# Patient Record
Sex: MALE | Race: WHITE | Employment: OTHER | URBAN - METROPOLITAN AREA
[De-identification: names, ages, dates, MRNs, and addresses within clinical notes are randomized per-mention and may not be internally consistent; named-entity substitution may affect disease eponyms.]

---

## 2019-03-18 RX ORDER — BISACODYL 5 MG/1
5 TABLET, DELAYED RELEASE ORAL DAILY PRN
COMMUNITY

## 2019-03-18 RX ORDER — MULTIVITAMIN,THERAPEUTIC
1 TABLET ORAL DAILY
COMMUNITY

## 2019-03-18 RX ORDER — ASCORBIC ACID 500 MG
500 TABLET ORAL DAILY
Status: ON HOLD | COMMUNITY
End: 2019-03-19

## 2019-03-18 RX ORDER — DOCUSATE SODIUM 100 MG/1
100 CAPSULE, LIQUID FILLED ORAL DAILY PRN
COMMUNITY

## 2019-03-18 RX ORDER — LIDOCAINE/PRILOCAINE 2.5 %-2.5%
CREAM (GRAM) TOPICAL DAILY PRN
COMMUNITY

## 2019-03-18 SDOH — HEALTH STABILITY: MENTAL HEALTH: HOW OFTEN DO YOU HAVE A DRINK CONTAINING ALCOHOL?: NEVER

## 2019-03-19 ENCOUNTER — HOSPITAL ENCOUNTER (OUTPATIENT)
Facility: CLINIC | Age: 64
Setting detail: OBSERVATION
Discharge: HOME | End: 2019-03-20
Attending: UROLOGY | Admitting: UROLOGY
Payer: MEDICARE

## 2019-03-19 ENCOUNTER — ANESTHESIA (OUTPATIENT)
Dept: SURGERY | Facility: CLINIC | Age: 64
End: 2019-03-19
Payer: MEDICARE

## 2019-03-19 ENCOUNTER — ANESTHESIA EVENT (OUTPATIENT)
Dept: SURGERY | Facility: CLINIC | Age: 64
End: 2019-03-19
Payer: MEDICARE

## 2019-03-19 DIAGNOSIS — C61 PROSTATE CANCER (H): Primary | ICD-10-CM

## 2019-03-19 LAB
ABO + RH BLD: NORMAL
ABO + RH BLD: NORMAL
BLD GP AB SCN SERPL QL: NORMAL
BLOOD BANK CMNT PATIENT-IMP: NORMAL
BLOOD BANK CMNT PATIENT-IMP: NORMAL
CREAT SERPL-MCNC: 5.42 MG/DL (ref 0.66–1.25)
GFR SERPL CREATININE-BSD FRML MDRD: 10 ML/MIN/{1.73_M2}
POTASSIUM SERPL-SCNC: 3.6 MMOL/L (ref 3.4–5.3)
SPECIMEN EXP DATE BLD: NORMAL

## 2019-03-19 PROCEDURE — 40000170 ZZH STATISTIC PRE-PROCEDURE ASSESSMENT II: Performed by: UROLOGY

## 2019-03-19 PROCEDURE — 27210794 ZZH OR GENERAL SUPPLY STERILE: Performed by: UROLOGY

## 2019-03-19 PROCEDURE — 82565 ASSAY OF CREATININE: CPT | Performed by: ANESTHESIOLOGY

## 2019-03-19 PROCEDURE — 25000125 ZZHC RX 250: Performed by: ANESTHESIOLOGY

## 2019-03-19 PROCEDURE — 25800030 ZZH RX IP 258 OP 636: Performed by: NURSE ANESTHETIST, CERTIFIED REGISTERED

## 2019-03-19 PROCEDURE — 25000128 H RX IP 250 OP 636: Performed by: UROLOGY

## 2019-03-19 PROCEDURE — 25000128 H RX IP 250 OP 636: Performed by: NURSE ANESTHETIST, CERTIFIED REGISTERED

## 2019-03-19 PROCEDURE — 71000012 ZZH RECOVERY PHASE 1 LEVEL 1 FIRST HR: Performed by: UROLOGY

## 2019-03-19 PROCEDURE — 25000566 ZZH SEVOFLURANE, EA 15 MIN: Performed by: UROLOGY

## 2019-03-19 PROCEDURE — 84132 ASSAY OF SERUM POTASSIUM: CPT | Performed by: ANESTHESIOLOGY

## 2019-03-19 PROCEDURE — 12000000 ZZH R&B MED SURG/OB

## 2019-03-19 PROCEDURE — 36000087 ZZH SURGERY LEVEL 8 EA 15 ADDTL MIN: Performed by: UROLOGY

## 2019-03-19 PROCEDURE — 88309 TISSUE EXAM BY PATHOLOGIST: CPT | Performed by: UROLOGY

## 2019-03-19 PROCEDURE — 25000128 H RX IP 250 OP 636: Performed by: ANESTHESIOLOGY

## 2019-03-19 PROCEDURE — 36415 COLL VENOUS BLD VENIPUNCTURE: CPT | Performed by: ANESTHESIOLOGY

## 2019-03-19 PROCEDURE — 25000301 ZZH OR RX SURGIFLO W/THROMBIN KIT 2ML 1991 OPNP: Performed by: UROLOGY

## 2019-03-19 PROCEDURE — 25000132 ZZH RX MED GY IP 250 OP 250 PS 637: Mod: GY | Performed by: PHYSICIAN ASSISTANT

## 2019-03-19 PROCEDURE — 25800025 ZZH RX 258: Performed by: UROLOGY

## 2019-03-19 PROCEDURE — 25800030 ZZH RX IP 258 OP 636: Performed by: PHYSICIAN ASSISTANT

## 2019-03-19 PROCEDURE — 25000125 ZZHC RX 250: Performed by: NURSE ANESTHETIST, CERTIFIED REGISTERED

## 2019-03-19 PROCEDURE — 25000125 ZZHC RX 250: Performed by: UROLOGY

## 2019-03-19 PROCEDURE — 25800030 ZZH RX IP 258 OP 636: Performed by: ANESTHESIOLOGY

## 2019-03-19 PROCEDURE — 37000009 ZZH ANESTHESIA TECHNICAL FEE, EACH ADDTL 15 MIN: Performed by: UROLOGY

## 2019-03-19 PROCEDURE — 36000085 ZZH SURGERY LEVEL 8 1ST 30 MIN: Performed by: UROLOGY

## 2019-03-19 PROCEDURE — 86850 RBC ANTIBODY SCREEN: CPT | Performed by: ANESTHESIOLOGY

## 2019-03-19 PROCEDURE — 25000128 H RX IP 250 OP 636: Performed by: PHYSICIAN ASSISTANT

## 2019-03-19 PROCEDURE — 37000008 ZZH ANESTHESIA TECHNICAL FEE, 1ST 30 MIN: Performed by: UROLOGY

## 2019-03-19 PROCEDURE — 88309 TISSUE EXAM BY PATHOLOGIST: CPT | Mod: 26 | Performed by: UROLOGY

## 2019-03-19 PROCEDURE — 86901 BLOOD TYPING SEROLOGIC RH(D): CPT | Performed by: ANESTHESIOLOGY

## 2019-03-19 PROCEDURE — A9270 NON-COVERED ITEM OR SERVICE: HCPCS | Mod: GY | Performed by: PHYSICIAN ASSISTANT

## 2019-03-19 PROCEDURE — 86900 BLOOD TYPING SEROLOGIC ABO: CPT | Performed by: ANESTHESIOLOGY

## 2019-03-19 RX ORDER — SODIUM CHLORIDE 9 MG/ML
INJECTION, SOLUTION INTRAVENOUS CONTINUOUS PRN
Status: DISCONTINUED | OUTPATIENT
Start: 2019-03-19 | End: 2019-03-19

## 2019-03-19 RX ORDER — EPHEDRINE SULFATE 50 MG/ML
INJECTION, SOLUTION INTRAMUSCULAR; INTRAVENOUS; SUBCUTANEOUS PRN
Status: DISCONTINUED | OUTPATIENT
Start: 2019-03-19 | End: 2019-03-19

## 2019-03-19 RX ORDER — DEXAMETHASONE SODIUM PHOSPHATE 4 MG/ML
INJECTION, SOLUTION INTRA-ARTICULAR; INTRALESIONAL; INTRAMUSCULAR; INTRAVENOUS; SOFT TISSUE PRN
Status: DISCONTINUED | OUTPATIENT
Start: 2019-03-19 | End: 2019-03-19

## 2019-03-19 RX ORDER — HYDROMORPHONE HYDROCHLORIDE 1 MG/ML
.3-.5 INJECTION, SOLUTION INTRAMUSCULAR; INTRAVENOUS; SUBCUTANEOUS EVERY 5 MIN PRN
Status: DISCONTINUED | OUTPATIENT
Start: 2019-03-19 | End: 2019-03-19 | Stop reason: HOSPADM

## 2019-03-19 RX ORDER — ONDANSETRON 2 MG/ML
4 INJECTION INTRAMUSCULAR; INTRAVENOUS EVERY 30 MIN PRN
Status: DISCONTINUED | OUTPATIENT
Start: 2019-03-19 | End: 2019-03-19 | Stop reason: HOSPADM

## 2019-03-19 RX ORDER — MAGNESIUM HYDROXIDE 1200 MG/15ML
LIQUID ORAL PRN
Status: DISCONTINUED | OUTPATIENT
Start: 2019-03-19 | End: 2019-03-19 | Stop reason: HOSPADM

## 2019-03-19 RX ORDER — HYDROMORPHONE HYDROCHLORIDE 1 MG/ML
.3-.5 INJECTION, SOLUTION INTRAMUSCULAR; INTRAVENOUS; SUBCUTANEOUS
Status: DISCONTINUED | OUTPATIENT
Start: 2019-03-19 | End: 2019-03-20 | Stop reason: HOSPADM

## 2019-03-19 RX ORDER — LIDOCAINE HYDROCHLORIDE 20 MG/ML
INJECTION, SOLUTION INFILTRATION; PERINEURAL PRN
Status: DISCONTINUED | OUTPATIENT
Start: 2019-03-19 | End: 2019-03-19

## 2019-03-19 RX ORDER — GLYCOPYRROLATE 0.2 MG/ML
INJECTION, SOLUTION INTRAMUSCULAR; INTRAVENOUS PRN
Status: DISCONTINUED | OUTPATIENT
Start: 2019-03-19 | End: 2019-03-19

## 2019-03-19 RX ORDER — PROCHLORPERAZINE MALEATE 5 MG
5 TABLET ORAL EVERY 6 HOURS PRN
Status: DISCONTINUED | OUTPATIENT
Start: 2019-03-19 | End: 2019-03-20 | Stop reason: HOSPADM

## 2019-03-19 RX ORDER — CEFAZOLIN SODIUM IN 0.9 % NACL 3 G/100 ML
3 INTRAVENOUS SOLUTION, PIGGYBACK (ML) INTRAVENOUS
Status: COMPLETED | OUTPATIENT
Start: 2019-03-19 | End: 2019-03-19

## 2019-03-19 RX ORDER — NEOSTIGMINE METHYLSULFATE 1 MG/ML
VIAL (ML) INJECTION PRN
Status: DISCONTINUED | OUTPATIENT
Start: 2019-03-19 | End: 2019-03-19

## 2019-03-19 RX ORDER — ONDANSETRON 4 MG/1
4 TABLET, ORALLY DISINTEGRATING ORAL EVERY 6 HOURS PRN
Status: DISCONTINUED | OUTPATIENT
Start: 2019-03-19 | End: 2019-03-20 | Stop reason: HOSPADM

## 2019-03-19 RX ORDER — NALOXONE HYDROCHLORIDE 0.4 MG/ML
.1-.4 INJECTION, SOLUTION INTRAMUSCULAR; INTRAVENOUS; SUBCUTANEOUS
Status: DISCONTINUED | OUTPATIENT
Start: 2019-03-19 | End: 2019-03-20 | Stop reason: HOSPADM

## 2019-03-19 RX ORDER — FENTANYL CITRATE 50 UG/ML
25-50 INJECTION, SOLUTION INTRAMUSCULAR; INTRAVENOUS
Status: DISCONTINUED | OUTPATIENT
Start: 2019-03-19 | End: 2019-03-19 | Stop reason: HOSPADM

## 2019-03-19 RX ORDER — BUPIVACAINE HYDROCHLORIDE 2.5 MG/ML
INJECTION, SOLUTION INFILTRATION; PERINEURAL PRN
Status: DISCONTINUED | OUTPATIENT
Start: 2019-03-19 | End: 2019-03-19 | Stop reason: HOSPADM

## 2019-03-19 RX ORDER — SODIUM CHLORIDE 9 MG/ML
INJECTION, SOLUTION INTRAVENOUS CONTINUOUS
Status: DISCONTINUED | OUTPATIENT
Start: 2019-03-19 | End: 2019-03-19

## 2019-03-19 RX ORDER — LIDOCAINE 40 MG/G
CREAM TOPICAL
Status: DISCONTINUED | OUTPATIENT
Start: 2019-03-19 | End: 2019-03-20 | Stop reason: HOSPADM

## 2019-03-19 RX ORDER — SODIUM CHLORIDE, SODIUM LACTATE, POTASSIUM CHLORIDE, CALCIUM CHLORIDE 600; 310; 30; 20 MG/100ML; MG/100ML; MG/100ML; MG/100ML
INJECTION, SOLUTION INTRAVENOUS CONTINUOUS
Status: DISCONTINUED | OUTPATIENT
Start: 2019-03-19 | End: 2019-03-19 | Stop reason: HOSPADM

## 2019-03-19 RX ORDER — CEFAZOLIN SODIUM 2 G/100ML
2 INJECTION, SOLUTION INTRAVENOUS
Status: DISCONTINUED | OUTPATIENT
Start: 2019-03-19 | End: 2019-03-19

## 2019-03-19 RX ORDER — NEOMYCIN/BACITRACIN/POLYMYXINB 3.5-400-5K
OINTMENT (GRAM) TOPICAL 4 TIMES DAILY PRN
Status: DISCONTINUED | OUTPATIENT
Start: 2019-03-19 | End: 2019-03-20 | Stop reason: HOSPADM

## 2019-03-19 RX ORDER — ONDANSETRON 2 MG/ML
INJECTION INTRAMUSCULAR; INTRAVENOUS PRN
Status: DISCONTINUED | OUTPATIENT
Start: 2019-03-19 | End: 2019-03-19

## 2019-03-19 RX ORDER — ONDANSETRON 2 MG/ML
4 INJECTION INTRAMUSCULAR; INTRAVENOUS EVERY 6 HOURS PRN
Status: DISCONTINUED | OUTPATIENT
Start: 2019-03-19 | End: 2019-03-20 | Stop reason: HOSPADM

## 2019-03-19 RX ORDER — SODIUM CHLORIDE 9 MG/ML
INJECTION, SOLUTION INTRAVENOUS CONTINUOUS
Status: ACTIVE | OUTPATIENT
Start: 2019-03-19 | End: 2019-03-19

## 2019-03-19 RX ORDER — HYDROCODONE BITARTRATE AND ACETAMINOPHEN 5; 325 MG/1; MG/1
1-2 TABLET ORAL EVERY 4 HOURS PRN
Status: DISCONTINUED | OUTPATIENT
Start: 2019-03-19 | End: 2019-03-20 | Stop reason: HOSPADM

## 2019-03-19 RX ORDER — FENTANYL CITRATE 50 UG/ML
INJECTION, SOLUTION INTRAMUSCULAR; INTRAVENOUS PRN
Status: DISCONTINUED | OUTPATIENT
Start: 2019-03-19 | End: 2019-03-19

## 2019-03-19 RX ORDER — ONDANSETRON 4 MG/1
4 TABLET, ORALLY DISINTEGRATING ORAL EVERY 30 MIN PRN
Status: DISCONTINUED | OUTPATIENT
Start: 2019-03-19 | End: 2019-03-19 | Stop reason: HOSPADM

## 2019-03-19 RX ORDER — ACETAMINOPHEN 325 MG/1
325-650 TABLET ORAL
Status: DISCONTINUED | OUTPATIENT
Start: 2019-03-19 | End: 2019-03-20 | Stop reason: HOSPADM

## 2019-03-19 RX ORDER — LIDOCAINE 50 MG/G
OINTMENT TOPICAL 4 TIMES DAILY PRN
Status: DISCONTINUED | OUTPATIENT
Start: 2019-03-19 | End: 2019-03-20 | Stop reason: HOSPADM

## 2019-03-19 RX ORDER — PROPOFOL 10 MG/ML
INJECTION, EMULSION INTRAVENOUS PRN
Status: DISCONTINUED | OUTPATIENT
Start: 2019-03-19 | End: 2019-03-19

## 2019-03-19 RX ORDER — NALOXONE HYDROCHLORIDE 0.4 MG/ML
.1-.4 INJECTION, SOLUTION INTRAMUSCULAR; INTRAVENOUS; SUBCUTANEOUS
Status: DISCONTINUED | OUTPATIENT
Start: 2019-03-19 | End: 2019-03-19

## 2019-03-19 RX ADMIN — MIDAZOLAM 2 MG: 1 INJECTION INTRAMUSCULAR; INTRAVENOUS at 11:36

## 2019-03-19 RX ADMIN — DEXAMETHASONE SODIUM PHOSPHATE 4 MG: 4 INJECTION, SOLUTION INTRA-ARTICULAR; INTRALESIONAL; INTRAMUSCULAR; INTRAVENOUS; SOFT TISSUE at 11:49

## 2019-03-19 RX ADMIN — FENTANYL CITRATE 50 MCG: 50 INJECTION, SOLUTION INTRAMUSCULAR; INTRAVENOUS at 12:16

## 2019-03-19 RX ADMIN — PHENYLEPHRINE HYDROCHLORIDE 200 MCG: 10 INJECTION, SOLUTION INTRAMUSCULAR; INTRAVENOUS; SUBCUTANEOUS at 11:51

## 2019-03-19 RX ADMIN — NEOSTIGMINE METHYLSULFATE 5 MG: 1 INJECTION, SOLUTION INTRAVENOUS at 15:09

## 2019-03-19 RX ADMIN — ROCURONIUM BROMIDE 50 MG: 10 INJECTION INTRAVENOUS at 11:41

## 2019-03-19 RX ADMIN — FENTANYL CITRATE 50 MCG: 50 INJECTION, SOLUTION INTRAMUSCULAR; INTRAVENOUS at 12:39

## 2019-03-19 RX ADMIN — LIDOCAINE HYDROCHLORIDE 60 MG: 20 INJECTION, SOLUTION INFILTRATION; PERINEURAL at 11:41

## 2019-03-19 RX ADMIN — Medication 10 MG: at 12:14

## 2019-03-19 RX ADMIN — GLYCOPYRROLATE 0.9 MG: 0.2 INJECTION, SOLUTION INTRAMUSCULAR; INTRAVENOUS at 15:09

## 2019-03-19 RX ADMIN — Medication 0.5 MG: at 15:45

## 2019-03-19 RX ADMIN — CISATRACURIUM BESYLATE 2 MG: 2 INJECTION INTRAVENOUS at 13:00

## 2019-03-19 RX ADMIN — PHENYLEPHRINE HYDROCHLORIDE 200 MCG: 10 INJECTION, SOLUTION INTRAMUSCULAR; INTRAVENOUS; SUBCUTANEOUS at 11:55

## 2019-03-19 RX ADMIN — PROPOFOL 200 MG: 10 INJECTION, EMULSION INTRAVENOUS at 11:41

## 2019-03-19 RX ADMIN — ROPIVACAINE HYDROCHLORIDE 30 ML GIVEN: 5 INJECTION, SOLUTION EPIDURAL; INFILTRATION; PERINEURAL at 15:18

## 2019-03-19 RX ADMIN — Medication 0.5 MG: at 16:06

## 2019-03-19 RX ADMIN — CISATRACURIUM BESYLATE 4 MG: 2 INJECTION INTRAVENOUS at 12:11

## 2019-03-19 RX ADMIN — SODIUM CHLORIDE: 9 INJECTION, SOLUTION INTRAVENOUS at 11:36

## 2019-03-19 RX ADMIN — FENTANYL CITRATE 100 MCG: 50 INJECTION, SOLUTION INTRAMUSCULAR; INTRAVENOUS at 11:41

## 2019-03-19 RX ADMIN — HYDROMORPHONE HYDROCHLORIDE 0.5 MG: 1 INJECTION, SOLUTION INTRAMUSCULAR; INTRAVENOUS; SUBCUTANEOUS at 20:35

## 2019-03-19 RX ADMIN — CISATRACURIUM BESYLATE 2 MG: 2 INJECTION INTRAVENOUS at 13:33

## 2019-03-19 RX ADMIN — PHENYLEPHRINE HYDROCHLORIDE 200 MCG: 10 INJECTION, SOLUTION INTRAMUSCULAR; INTRAVENOUS; SUBCUTANEOUS at 11:59

## 2019-03-19 RX ADMIN — PHENYLEPHRINE HYDROCHLORIDE 100 MCG: 10 INJECTION, SOLUTION INTRAMUSCULAR; INTRAVENOUS; SUBCUTANEOUS at 15:08

## 2019-03-19 RX ADMIN — ACETAMINOPHEN 650 MG: 325 TABLET, FILM COATED ORAL at 17:50

## 2019-03-19 RX ADMIN — CISATRACURIUM BESYLATE 2 MG: 2 INJECTION INTRAVENOUS at 14:00

## 2019-03-19 RX ADMIN — SODIUM CHLORIDE: 9 INJECTION, SOLUTION INTRAVENOUS at 10:51

## 2019-03-19 RX ADMIN — LIDOCAINE HYDROCHLORIDE 2 ML: 10 INJECTION, SOLUTION EPIDURAL; INFILTRATION; INTRACAUDAL; PERINEURAL at 10:58

## 2019-03-19 RX ADMIN — Medication 1 G: at 11:45

## 2019-03-19 RX ADMIN — ONDANSETRON 4 MG: 2 INJECTION INTRAMUSCULAR; INTRAVENOUS at 14:45

## 2019-03-19 RX ADMIN — FENTANYL CITRATE 50 MCG: 50 INJECTION, SOLUTION INTRAMUSCULAR; INTRAVENOUS at 14:36

## 2019-03-19 RX ADMIN — PHENYLEPHRINE HYDROCHLORIDE 0.25 MCG/KG/MIN: 10 INJECTION, SOLUTION INTRAMUSCULAR; INTRAVENOUS; SUBCUTANEOUS at 12:17

## 2019-03-19 RX ADMIN — SODIUM CHLORIDE: 9 INJECTION, SOLUTION INTRAVENOUS at 17:50

## 2019-03-19 RX ADMIN — Medication 10 MG: at 12:04

## 2019-03-19 RX ADMIN — PHENYLEPHRINE HYDROCHLORIDE 200 MCG: 10 INJECTION, SOLUTION INTRAMUSCULAR; INTRAVENOUS; SUBCUTANEOUS at 12:14

## 2019-03-19 RX ADMIN — CISATRACURIUM BESYLATE 1 MG: 2 INJECTION INTRAVENOUS at 14:45

## 2019-03-19 ASSESSMENT — MIFFLIN-ST. JEOR: SCORE: 2254.95

## 2019-03-19 ASSESSMENT — COPD QUESTIONNAIRES: COPD: 0

## 2019-03-19 ASSESSMENT — ACTIVITIES OF DAILY LIVING (ADL): ADLS_ACUITY_SCORE: 11

## 2019-03-19 ASSESSMENT — LIFESTYLE VARIABLES: TOBACCO_USE: 1

## 2019-03-19 NOTE — CONSULTS
BRIEF HOSPITALIST CONSULT:    Hospitalist consult was placed for post-op evaluation, dialysis patient.  Spoke to the floor nurse (Rosemarie Nixon) and patient appears to be doing well.      Review of medical history shows ESRD undergoing dialysis on Mon, Wed, and Friday and on no other medications except for Auryxia and supplements that have been ordered.      Have placed a Nephrology consult and order to check a BMP in the morning.  Will decrease IVF to 75 ml/hr for 3 hours then stop.      Hospitalist service will sign off.      Discussed with Dr. Colindres who is in agreement.      Bo Fernando PA-C

## 2019-03-19 NOTE — ANESTHESIA POSTPROCEDURE EVALUATION
Patient: Eduardo Gauthier    Procedure(s):  DAVINCI XI RADICAL PROSTATECTOMY (ANESTHESIA: GENERAL WITH TAP BLOCK)    Diagnosis:PROSTATE CANCER  Diagnosis Additional Information: No value filed.    Anesthesia Type:  General    Note:  Anesthesia Post Evaluation    Patient location during evaluation: PACU  Patient participation: Able to fully participate in evaluation  Level of consciousness: awake and alert  Pain management: adequate  Airway patency: patent  Cardiovascular status: acceptable  Respiratory status: acceptable  Hydration status: acceptable  PONV: none     Anesthetic complications: None    Comments: Noted some R arm pain centered around AC and BP cuff on emergence. BP moved to calf (unable to use L arm 2/2 fistula) and ice pack applied. Normal sensation in R hand and forearm, strength 5/5 with , finger adduction/abduction, wrist flexion/extension. Pain improving. Otherwise doing well.        Last vitals:  Vitals:    03/19/19 1540 03/19/19 1550 03/19/19 1600   BP: 108/71  146/81   Pulse: 66  63   Resp: 15 9 10   Temp: 36.6  C (97.8  F)     SpO2: 97% 95% 95%         Electronically Signed By: Oneil Kunz MD  March 19, 2019  4:11 PM

## 2019-03-19 NOTE — PROGRESS NOTES
Admission medication history interview status for the 3/19/2019  admission is complete. See EPIC admission navigator for prior to admission medications     Medication history source reliability:Good    Medication history interview source(s):Patient    Medication history resources (including written lists, pill bottles, clinic record):None    Primary pharmacy.[facility]    Additional medication history information not noted on PTA med list :None    Time spent in this activity: 45 minutes    Prior to Admission medications    Medication Sig Last Dose Taking? Auth Provider   bisacodyl (DULCOLAX) 5 MG EC tablet Take 5 mg by mouth daily as needed for constipation 3/17/2019 at prn Yes Reported, Patient   docusate sodium (DULCOLAX STOOL SOFTENER) 100 MG capsule Take 100 mg by mouth daily as needed for constipation (Dulcolax) 3/17/2019 at prn Yes Reported, Patient   Ferric Citrate (AURYXIA) 1  MG(Fe) tablet Take 840 mg by mouth 3 times daily (with meals) (4 tablet x 210 mg = 840 mg dose) 3/18/2019 at pm Yes Reported, Patient   lidocaine-prilocaine (EMLA) 2.5-2.5 % external cream Apply topically daily as needed for moderate pain (One hour prior to treatment) To Dialysis Access port. 3/18/2019 at prn Yes Reported, Patient   multivitamin, therapeutic (THERA-VIT) TABS tablet Take 1 tablet by mouth daily Men's 50+ (no iron) 3/17/2019 Yes Reported, Patient   ranitidine (ZANTAC) 75 MG tablet Take  mg by mouth daily as needed for heartburn 3/16/2019 Yes Reported, Patient

## 2019-03-19 NOTE — ANESTHESIA PREPROCEDURE EVALUATION
Anesthesia Pre-Procedure Evaluation    Patient: Eduardo Gauthier   MRN: [id number] : 1955          Preoperative Diagnosis: PROSTATE CANCER    Procedure(s):  DAVINCI XI RADICAL PROSTATECTOMY (ANESTHESIA: GENERAL WITH TAP BLOCK)    Past Medical History:   Diagnosis Date     Anemia      Cancer (H)      Dialysis patient (H)      Renal disease      History reviewed. No pertinent surgical history.    Anesthesia Evaluation     . Pt has had prior anesthetic. Type: General    No history of anesthetic complications          ROS/MED HX    ENT/Pulmonary:     (+)tobacco use, Past use , . .   (-) asthma and COPD   Neurologic:      (-) CVA, TIA and Neuropathy   Cardiovascular:        (-) hypertension, CAD, irregular heartbeat/palpitations and stent   METS/Exercise Tolerance:     Hematologic:        (-) anemia   Musculoskeletal:         GI/Hepatic:        (-) GERD and liver disease   Renal/Genitourinary:     (+) chronic renal disease, type: ESRD, Pt requires dialysis, type: Hemodialysis,       Endo:      (-) Type I DM, Type II DM and thyroid disease   Psychiatric:         Infectious Disease:  - neg infectious disease ROS       Malignancy:   (+) Malignancy History of Prostate          Other:                          Physical Exam  Normal systems: cardiovascular and pulmonary    Airway   Mallampati: II  TM distance: >3 FB  Neck ROM: full    Dental   (+) missing  Comment: Numerous teeth missing    Cardiovascular   Rhythm and rate: regular and normal      Pulmonary    breath sounds clear to auscultation    Other findings: Fistula with pulse and palpable thrill in L upper arm        No results found for: WBC, HGB, HCT, PLT, CRP, SED, NA, POTASSIUM, CHLORIDE, CO2, BUN, CR, GLC, CLAUDIA, PHOS, MAG, ALBUMIN, PROTTOTAL, ALT, AST, GGT, ALKPHOS, BILITOTAL, BILIDIRECT, LIPASE, AMYLASE, CHELSEA, PTT, INR, FIBR, TSH, T4, T3, HCG, HCGS, CKTOTAL, CKMB, TROPN    Preop Vitals  BP Readings from Last 3 Encounters:   19 139/82    Pulse Readings  "from Last 3 Encounters:   No data found for Pulse      Resp Readings from Last 3 Encounters:   03/19/19 16    SpO2 Readings from Last 3 Encounters:   03/19/19 96%      Temp Readings from Last 1 Encounters:   03/19/19 37.1  C (98.8  F) (Temporal)    Ht Readings from Last 1 Encounters:   03/19/19 1.956 m (6' 5\")      Wt Readings from Last 1 Encounters:   03/19/19 135.3 kg (298 lb 3 oz)    Estimated body mass index is 35.36 kg/m  as calculated from the following:    Height as of this encounter: 1.956 m (6' 5\").    Weight as of this encounter: 135.3 kg (298 lb 3 oz).       Anesthesia Plan      History & Physical Review  History and physical reviewed and following examination; no interval change.    ASA Status:  3 .        Plan for General and ETT with Intravenous induction. Maintenance will be Balanced.    PONV prophylaxis:  Ondansetron (or other 5HT-3) and Dexamethasone or Solumedrol  Additional equipment: 2nd IV      Postoperative Care  Postoperative pain management:  Oral pain medications, Peripheral nerve block (Single Shot) and Multi-modal analgesia.      Consents  Anesthetic plan, risks, benefits and alternatives discussed with:  Patient..                 Oneil Kunz MD  "

## 2019-03-19 NOTE — OP NOTE
[facility] Urology Brief Operative Note    Pre-operative diagnosis: PROSTATE CANCER, Neurogenic bladder, renal failure   Post-operative diagnosis: Same   Procedure: Procedure(s):  DAVINCI XI RADICAL PROSTATECTOMY (ANESTHESIA: GENERAL WITH TAP BLOCK)   SP tube placement.   Surgeon: Godwin Victor MD   Assistant(s): Keyana Raya   Anesthesia: General endotracheal anesthesia   Estimated blood loss: Less than 100 ml   Total IV fluids: (See anesthesia record)   Blood transfusion: No transfusion was given during surgery   Total urine output: (See anesthesia record)   Drains: Wade-Wallace   Specimens: Prostate   Implants: None   Findings: Prostate ca, Sp tube   Complications: None   Condition: Stable   Comments: See dictated operative report for full details

## 2019-03-19 NOTE — PROCEDURES
Post-operative Note    Preoperative Diagnosis:  Prostate Cancer, Neurogenic bladder, renal failure  Postoperative Diagnosis:  Same    Procedure:  Robot Assisted Radical Prostatectomy , Take down of adhesions from SP tube                      Sp tube placement       Surgeon(s):  Godwin Victor MD  Assistant Surgeon(s): Keyana Raya  Date of Procedure: 3/19/2019    PSA: 9.5  PROSTATE VOLUME:70 gms  PATHOLOGY:  Adenocarcinoma, Jose Luis grade 6  Right Toponas:   Right Mid:   Right Base:   Left Toponas: 3/3  Left Mid:   Left Base: 3/3        POTENCY SPARING:  None  RISK FACTORS:   Body mass index is 35.36 kg/m .   Hernia:    No   Appendectomy: No   Adhesions: Yes              Surgeries: No    PORT PLACEMENT:  Standard 6 ports     After discussing all treatment options, the patient elected to proceed with robotic prostatectomy.  The patient understands that we will proceed with robotic prostatectomy, but will convert to open prostatectomy if needed. He has renal failure and is on dialysis. Has neurogenic bladder and is managed with SP tube. Pt prefers to have SP tube after prostatectomy.    DESCRIPTION OF PROCEDURE:    The patient was identified and was brougt to the operating room.  Hewas placed on a Gelfoam padded table.  After adequate general anesthesia, he was placed in low stirrups.  Pneumatic compression stockings had been applied.  All the pressure points were adequately padded.  Shoulder braces were used, these were appropriately positioned not to cause any pressure. The existing SP tube was removed.  The patient was then prepped and draped in the usual manner.  Time out was called.  An 18 Chinese perla catheter was placed with in the sterile field and the bladder was emptied.  I made a small supra umbilical incision.  Stay sutures were placed on the fascia.  Using a Veress needle, pneumoperitoneum was achieved.  I then placed a 12 mm port at this site.  Initial endoscopic inspection with a 0 degree lens showed  findings as noted above.  The remaining ports were then placed.  Two 8 mm ports were placed on either side 10 cm from the umbilical port.  A 12 mm port was placed in the right lower quadrant above the anterior superior iliac spine, and a similar location on the left side an 8 mm port was placed.  A 8 mm airseal/suction port was placed lateral to the camera port on the right side.  With all the ports in place the patient was placed in steep trendelenburg position and the robot was docked.    I performed the robotic prostatectomy in my standard fashion.  4 arms were used.  The surgery was performed using a 0 degree lens.  Guarded scissors in arm 1, PK coagulator in arm 2 and prograsp in arm 3.  I first took down the recto-sigmoid adhesions. Than I incised the peritonium at the bladder base.  The seminal vesicles and vas deferens were identified and dissected free and the plane between the prostate and rectum was identified and seperated as distal and lateral as possible.  Next peritoneal incisions were made lateral to the medial umbilical ligaments and the bladder was dissected away from the anterior abdominal wall and pubic ramus to expose the prostate.  The sp site was excised and the adhesions taken down. The superficial dorsal vein was cauterized and transected.  The endopelvic fascia was opened.  I then proceeded to dissect the tissue away from the apex of the prostate down to the deep dorsal vein complex.  I then placed a 0 Vicryl suture on a CT-1 needle to ligate the deep dorsal complex.      Next the anterior bladder wall was incised at the level of the bladder neck.  The perla catheter was extracted from the bladder and placed to traction using the third arm.  The posterior bladder neck was then excised and dissected away from the base of the prostate.  The previously dissected seminal vesicles and vas deferens were now brought into view.  These structures were then tented up.  The plane between the prostate  and the rectum was further dissected up to the apex of the prostate.  This exposed the lateral pedicles.      I then proceeded to identify the plane between the lateral prostate and the lateral pedicles. Vessel sealer was used and the lateral pedicle was/were transected.  Electro Cautery was used in this area.  Nerve preservation was performed.  The dissection was carried around the apex of the prostate.  Next  I transected the deep dorsal vein and further dissection was carried around the apex of the prostate.  The urethra was transected distal to the apex of the prostate and the prostate was placed in am Endopouch.  Neil-Seal was used to achieve further hemostasis.    Next, I proceeded to anastomosis the bladder neck to the urethra using a running suture of 3-0 Monocryl on a double armed needle.  A two layer anastomosis was performed posteriorly.  A 20 Solomon Islander perla catheter was placed and the bladder irrigated well.  A good watertight , tension free anastasmosis was obtained. I than placed the SP tube. A 20 Fr perla was advanced thru the existing SP site. I advanced it into the bladder near the dome. The bladder was than tacked to the abdominal wall with 2 0 vicryl suture. The sp baloon was inflated to 15 ml.  Further inspection at this time showed no further bleeding.  The string of the Endopouch was then brought out through the umbilical port site.   The robot was then docked away.  A 15 Solomon Islander round Wade Wallace was placed through the left lower quadrant port and all the laparoscopic ports were removed.  I slightly extended the supraumbilical incision and the prostate was removed through this site.  The fascia was then closed using Vicryl sutures.  The skin incisions were closed using staples. The sp tube was secured with a silk stitch.  The Wade Wallace was secured to the skin with a silk stitch.  The needle, sponge and lap counts were correct.  The patient remained stable throughout the entire procedure.   The estimated blood loss was 100 cc  . The patient tolerated the procedure well and was sent to the recovery room in stable condition.

## 2019-03-19 NOTE — PLAN OF CARE
POD#0 prostatectomy with Dr. Victor. Patient arrived to floor at about 1730. VSS on 4L O2 via NC. Capno WDL. LS clear. IS at bedside and instructed how to use. Midline incision, CDI. L JANIE to bulb suction, drainage is bloody/red. Dressing scant dried drainage. 3 steri-strips with scant drainage. Cazares cathter draining francie urine. Rating pain 3/10 in incisions. Declined pain medication other than scheduled tylenol. Ice packs for comfort. Clear liquid diet, advance to fulls as tolerated. Has not gotten out of bed yet. Recommend up with assist x1 + walker + gait-belt. Nephrology consulted. Patient has dialysis MWF. Last run was yesterday 3/18/19. IVF infusing at 75mL/hr. Saline lock IV at 2130. Discharge pending post-op course. Continue pain control, ambulation, and encourage intake as tolerated.

## 2019-03-19 NOTE — ANESTHESIA PROCEDURE NOTES
Peripheral nerve/Neuraxial procedure note : tap  Pre-Procedure  Performed by Oneil Kunz MD  Location: OR    Procedure Times:3/19/2019 3:12 PM and 3/19/2019 3:19 PM  Pre-Anesthestic Checklist: patient identified, IV checked, risks and benefits discussed, informed consent, monitors and equipment checked, pre-op evaluation, at physician/surgeon's request and post-op pain management    Timeout  Correct Patient: Yes   Correct Procedure: Yes   Correct Site: Yes   Correct Laterality: Yes   Correct Position: Yes     .   Procedure Documentation    .    Procedure:  bilateral  Tap.  Local skin infiltrated with 5 mL of 1% lidocaine.     Ultrasound used to identify targeted nerve, plexus, or vascular marker and placed a needle adjacent to it., Ultrasound was used to visualize the spread of the anesthetic in close proximity to the above stated nerve. A permanent image is entered into the patient's record.  Patient Prep;mask, sterile gloves, chlorhexidine gluconate and isopropyl alcohol.  .  Needle: insulated Needle Gauge: 22.    Needle Length (Inches) 3.13  Insertion Method: Single Shot.       Assessment/Narrative  Paresthesias: No.  Injection made incrementally with aspirations every 5 mL..  The placement was negative for: blood aspirated, painful injection and site bleeding.  Bolus given via needle. No blood aspirated via catheter.   Secured via.   Complications: none. Comments:  Injected a total of 60 mL of 0.25% ropivacaine with epi 1:800K between bilateral sides in divided doses with frequent aspiration.

## 2019-03-19 NOTE — OR NURSING
Pt arrived from OR complaining of  pain on Right AC. Warm pack was applied for comfort. Pt stated no relief from symptoms. Cold pack was applied, pt stated relief from pain.

## 2019-03-19 NOTE — ANESTHESIA CARE TRANSFER NOTE
Patient: Eduardo Gauthier    Procedure(s):  DAVINCI XI RADICAL PROSTATECTOMY (ANESTHESIA: GENERAL WITH TAP BLOCK)    Diagnosis: PROSTATE CANCER  Diagnosis Additional Information: No value filed.    Anesthesia Type:   General     Note:  Airway :Face Mask  Patient transferred to:PACU  Comments: Neuromuscular blockade reversed after TOF 4/4, spontaneous respirations, adequate tidal volumes, followed commands to voice, oropharynx suctioned with soft flexible catheter, extubated atraumatically, extubated with suction, airway patent after extubation.  Oxygen via facemask at 8 liters per minute to PACU. Oxygen tubing connected to wall O2 in PACU, SpO2, NiBP, and EKG monitors and alarms on and functioning, Linda Hugger warmer connected to patient gown, report on patient's clinical status given to PACU RN, RN questions answered. Handoff Report: Identifed the Patient, Identified the Reponsible Provider, Reviewed the pertinent medical history, Discussed the surgical course, Reviewed Intra-OP anesthesia mangement and issues during anesthesia, Set expectations for post-procedure period and Allowed opportunity for questions and acknowledgement of understanding      Vitals: (Last set prior to Anesthesia Care Transfer)    CRNA VITALS  3/19/2019 1457 - 3/19/2019 1534      3/19/2019             Pulse:  61    SpO2:  94 %    Resp Rate (set):  10                Electronically Signed By: CAITLYN García CRNA  March 19, 2019  3:34 PM

## 2019-03-19 NOTE — OR NURSING
MD Kunz assessed patient Right arm. No concerns at this time. Md approved to transfer patient to station 88

## 2019-03-20 VITALS
WEIGHT: 298.19 LBS | SYSTOLIC BLOOD PRESSURE: 110 MMHG | TEMPERATURE: 96.4 F | HEART RATE: 61 BPM | DIASTOLIC BLOOD PRESSURE: 65 MMHG | BODY MASS INDEX: 35.21 KG/M2 | OXYGEN SATURATION: 96 % | RESPIRATION RATE: 16 BRPM | HEIGHT: 77 IN

## 2019-03-20 LAB
ANION GAP SERPL CALCULATED.3IONS-SCNC: 9 MMOL/L (ref 3–14)
BUN SERPL-MCNC: 40 MG/DL (ref 7–30)
CALCIUM SERPL-MCNC: 8.5 MG/DL (ref 8.5–10.1)
CHLORIDE SERPL-SCNC: 98 MMOL/L (ref 94–109)
CO2 SERPL-SCNC: 30 MMOL/L (ref 20–32)
CREAT SERPL-MCNC: 6.01 MG/DL (ref 0.66–1.25)
GFR SERPL CREATININE-BSD FRML MDRD: 9 ML/MIN/{1.73_M2}
GLUCOSE SERPL-MCNC: 110 MG/DL (ref 70–99)
HGB BLD-MCNC: 13.1 G/DL (ref 13.3–17.7)
POTASSIUM SERPL-SCNC: 4.2 MMOL/L (ref 3.4–5.3)
SODIUM SERPL-SCNC: 137 MMOL/L (ref 133–144)

## 2019-03-20 PROCEDURE — A9270 NON-COVERED ITEM OR SERVICE: HCPCS | Mod: GY | Performed by: PHYSICIAN ASSISTANT

## 2019-03-20 PROCEDURE — 80048 BASIC METABOLIC PNL TOTAL CA: CPT | Performed by: PHYSICIAN ASSISTANT

## 2019-03-20 PROCEDURE — 85018 HEMOGLOBIN: CPT | Performed by: PHYSICIAN ASSISTANT

## 2019-03-20 PROCEDURE — 25000132 ZZH RX MED GY IP 250 OP 250 PS 637: Mod: GY | Performed by: PHYSICIAN ASSISTANT

## 2019-03-20 PROCEDURE — G0378 HOSPITAL OBSERVATION PER HR: HCPCS

## 2019-03-20 PROCEDURE — 36415 COLL VENOUS BLD VENIPUNCTURE: CPT | Performed by: PHYSICIAN ASSISTANT

## 2019-03-20 PROCEDURE — 25000128 H RX IP 250 OP 636: Performed by: PHYSICIAN ASSISTANT

## 2019-03-20 RX ORDER — HYDROCODONE BITARTRATE AND ACETAMINOPHEN 5; 325 MG/1; MG/1
1 TABLET ORAL EVERY 6 HOURS PRN
Qty: 12 TABLET | Refills: 0 | Status: SHIPPED | OUTPATIENT
Start: 2019-03-20

## 2019-03-20 RX ADMIN — ACETAMINOPHEN 650 MG: 325 TABLET, FILM COATED ORAL at 13:09

## 2019-03-20 RX ADMIN — ACETAMINOPHEN 650 MG: 325 TABLET, FILM COATED ORAL at 01:09

## 2019-03-20 RX ADMIN — HYDROMORPHONE HYDROCHLORIDE 0.5 MG: 1 INJECTION, SOLUTION INTRAMUSCULAR; INTRAVENOUS; SUBCUTANEOUS at 05:32

## 2019-03-20 RX ADMIN — ACETAMINOPHEN 650 MG: 325 TABLET, FILM COATED ORAL at 08:05

## 2019-03-20 ASSESSMENT — ACTIVITIES OF DAILY LIVING (ADL)
ADLS_ACUITY_SCORE: 12

## 2019-03-20 NOTE — PROGRESS NOTES
[facility]    Urology Progress Note     Assessment & Plan   Eduardo Gauthier is a 64 year old male who was admitted on 3/19/2019. POD #1 s/p RALP with Dr. Victor-- doing well     Plan:   -Full liquid diet  -increase ambulation and IS use   -Nephrology to see today, likely resume outpatient dialysis tomorrow   -Transition to oral pain control     Plan for discharge home this afternoon  Will coordinate follow up with Dr. Victor in 2-3wks for urethral perla removal     Keyana Raya PA-C  [facility]   [email or web address]  Text Page (7:30am to 4:30pm)    Interval History   No acute events overnight   Pain minimal and well controlled   Ambulated this AM, no issues   Tolerating clears     AVSS  Hb 13.1    UOP 550cc, francie   JANIE output 115/55cc, serosang     Physical Exam   Temp: 96.4  F (35.8  C) Temp src: Oral BP: 110/65 Pulse: 61 Heart Rate: 63 Resp: 16 SpO2: 96 % O2 Device: None (Room air) Oxygen Delivery: 4 LPM  Vitals:    03/19/19 1026   Weight: 135.3 kg (298 lb 3 oz)     Vital Signs with Ranges  Temp:  [95.3  F (35.2  C)-98.8  F (37.1  C)] 96.4  F (35.8  C)  Pulse:  [61-66] 61  Heart Rate:  [57-75] 63  Resp:  [9-23] 16  BP: ()/(63-82) 110/65  SpO2:  [92 %-98 %] 96 %  I/O last 3 completed shifts:  In: 2138 [I.V.:2138]  Out: 765 [Urine:550; Drains:115; Blood:100]    Sitting up in bed, NAD  Breathing unlabored   Abd soft, appropriately tender, mildly distended   Incision sites cdi with staples and romina; JANIE serosang   SPT in place and capped   Urethral perla with francie output   No le edema   A&Ox3    Medications       acetaminophen  325-650 mg Oral Q6H     Ferric Citrate  840 mg Oral TID w/meals     sodium chloride (PF)  3 mL Intracatheter Q8H       Data   Results for orders placed or performed during the hospital encounter of 03/19/19 (from the past 24 hour(s))   Creatinine   Result Value Ref Range    Creatinine 5.42 (H) 0.66 - 1.25 mg/dL    GFR Estimate 10 (L) >60  mL/min/[1.73_m2]    GFR Estimate If Black 12 (L) >60 mL/min/[1.73_m2]   Potassium   Result Value Ref Range    Potassium 3.6 3.4 - 5.3 mmol/L   ABO/Rh type and screen   Result Value Ref Range    ABO O     RH(D) Pos     Antibody Screen Neg     Test Valid Only At [facility]        Specimen Expires 03/22/2019     Blood Bank Comment Drawn above stopped IV    Hospitalist IP Consult: Patient to be seen: Routine - within 24 hours; Post Op Evaluation, dialysis patient; Consultant may enter orders: Yes; Requesting provider? Attending physician    Gaston Dowd PA-C     3/19/2019  6:14 PM  BRIEF HOSPITALIST CONSULT:    Hospitalist consult was placed for post-op evaluation, dialysis   patient.  Spoke to the floor nurse (Rosemarie Nixon) and patient   appears to be doing well.      Review of medical history shows ESRD undergoing dialysis on Mon,   Wed, and Friday and on no other medications except for Auryxia   and supplements that have been ordered.      Have placed a Nephrology consult and order to check a BMP in the   morning.  Will decrease IVF to 75 ml/hr for 3 hours then stop.      Hospitalist service will sign off.      Discussed with Dr. Colindres who is in agreement.      Bo Fernando PA-C   Hemoglobin   Result Value Ref Range    Hemoglobin 13.1 (L) 13.3 - 17.7 g/dL   Basic metabolic panel   Result Value Ref Range    Sodium 137 133 - 144 mmol/L    Potassium 4.2 3.4 - 5.3 mmol/L    Chloride 98 94 - 109 mmol/L    Carbon Dioxide 30 20 - 32 mmol/L    Anion Gap 9 3 - 14 mmol/L    Glucose 110 (H) 70 - 99 mg/dL    Urea Nitrogen 40 (H) 7 - 30 mg/dL    Creatinine 6.01 (H) 0.66 - 1.25 mg/dL    GFR Estimate 9 (L) >60 mL/min/[1.73_m2]    GFR Estimate If Black 10 (L) >60 mL/min/[1.73_m2]    Calcium 8.5 8.5 - 10.1 mg/dL

## 2019-03-20 NOTE — PROGRESS NOTES
Assessment and Plan:   ESRD: on HD at the [facility]. Runs [facility]. Had HD on Monday pre-op. Labs and volume status ok. OK for discharge with dialysis in am as outpt.             Interval History:   S/P prostatectomy yesterday. Due for discharge today. Has both urethral catheter and suprapubic catheter in place.                  Review of Systems:   No SOB. Denies pain. Ambulating in halls.           Medications:       - MEDICATION INSTRUCTIONS for Dialysis Patients -   Does not apply See Admin Instructions     acetaminophen  325-650 mg Oral Q6H     Ferric Citrate  840 mg Oral TID w/meals     sodium chloride (PF)  3 mL Intracatheter Q8H         Current active medications and PTA medications reviewed, see medication list for details.            Physical Exam:   Vitals were reviewed  Patient Vitals for the past 24 hrs:   BP Temp Temp src Pulse Heart Rate Resp SpO2   03/20/19 0953 -- -- -- -- -- 16 --   03/20/19 0802 110/65 96.4  F (35.8  C) Oral -- 63 16 96 %   03/20/19 0105 99/63 95.6  F (35.3  C) Oral -- 64 16 94 %   03/19/19 2145 138/68 95.3  F (35.2  C) Oral -- 66 -- 98 %   03/19/19 2035 -- -- -- -- -- 16 --   03/19/19 1731 152/67 96.8  F (36  C) Oral -- 62 14 92 %   03/19/19 1704 -- -- -- -- -- -- 93 %   03/19/19 1650 134/73 -- -- -- 58 12 93 %   03/19/19 1640 134/73 97.1  F (36.2  C) Temporal -- 57 12 92 %   03/19/19 1630 141/71 -- -- 61 58 12 96 %   03/19/19 1620 140/74 97.1  F (36.2  C) Temporal -- 59 12 95 %   03/19/19 1610 140/74 -- -- -- 60 11 93 %   03/19/19 1606 -- -- -- -- -- 12 --   03/19/19 1600 146/81 -- -- 63 65 10 95 %   03/19/19 1550 139/78 -- -- -- 64 9 95 %   03/19/19 1540 108/71 97.8  F (36.6  C) -- 66 67 15 97 %   03/19/19 1530 134/77 -- -- 65 64 23 98 %       Temp:  [95.3  F (35.2  C)-97.8  F (36.6  C)] 96.4  F (35.8  C)  Pulse:  [61-66] 61  Heart Rate:  [57-67] 63  Resp:  [9-23] 16  BP: ()/(63-81) 110/65  SpO2:  [92 %-98 %] 96 %    Temperatures:  Current - Temp: 96.4   F (35.8  C); Max - Temp  Av.6  F (35.9  C)  Min: 95.3  F (35.2  C)  Max: 97.8  F (36.6  C)  Respiration range: Resp  Av.7  Min: 9  Max: 23  Pulse range: Pulse  Av.8  Min: 61  Max: 66  Blood pressure range: Systolic (24hrs), Av , Min:99 , Max:152   ; Diastolic (24hrs), Av, Min:63, Max:81    Pulse oximetry range: SpO2  Av.8 %  Min: 92 %  Max: 98 %    I/O last 3 completed shifts:  In: 8 [I.V.:]  Out: 765 [Urine:550; Drains:115; Blood:100]      Intake/Output Summary (Last 24 hours) at 3/20/2019 1130  Last data filed at 3/20/2019 1000  Gross per 24 hour   Intake 2138 ml   Output 770 ml   Net 1368 ml       Alert and responsive  Lungs with clear BS  Cor RRR nl S1 S2 no M, + venous hum  LE no edema  LAF with good thrill       Wt Readings from Last 4 Encounters:   19 135.3 kg (298 lb 3 oz)          Data:          Lab Results   Component Value Date     2019    Lab Results   Component Value Date    CHLORIDE 98 2019    Lab Results   Component Value Date    BUN 40 2019        Lab Results   Component Value Date    POTASSIUM 4.2 2019    POTASSIUM 3.6 2019    Lab Results   Component Value Date    CO2 30 2019    Lab Results   Component Value Date    CR 6.01 2019    CR 5.42 2019        Recent Labs   Lab Test 19  0750   HGB 13.1*     No results for input(s): AST, ALT, GGT, ALKPHOS, BILITOTAL, BILICONJ, BILIDIRECT, CHELSEA in the last 97267 hours.    Invalid input(s): BILIRUBININDIRECT    No results for input(s): MAG in the last 23469 hours.  No results for input(s): PHOS in the last 45558 hours.  Recent Labs   Lab Test 19  0750   CLAUDIA 8.5       Lab Results   Component Value Date    CLAUDIA 8.5 2019     Lab Results   Component Value Date    HGB 13.1 (L) 2019     Lab Results   Component Value Date     2019    POTASSIUM 4.2 2019    CHLORIDE 98 2019    CO2 30 2019     (H) 2019     Lab  Results   Component Value Date    BUN 40 (H) 03/20/2019    CR 6.01 (H) 03/20/2019     No results found for: MAG  No results found for: PHOS    Creatinine   Date Value Ref Range Status   03/20/2019 6.01 (H) 0.66 - 1.25 mg/dL Final   03/19/2019 5.42 (H) 0.66 - 1.25 mg/dL Final     Comment:     Drawn above stopped IV       Attestation:  I have reviewed today's vital signs, notes, medications, labs and imaging.     Froy Shipman MD

## 2019-03-20 NOTE — PLAN OF CARE
Patient is POD1 of a divinci prostatectomy with Dr. Victor. VSS on room air. VSS. C/o abdominal pain with ambulation, gave dilaudid x2. L JANIE to bulb suction, bloody drainage. 3 steri-strips with minimal drainage. SP catheter clamped and covered with dressing. Cazares catheter draining bloody drainage. Up with SBA and walker. Tolerating clear liquid diet. R hand PIV SL and R arm PIV SL. Patient has dialysis MWF. Calls appropriately.

## 2019-03-20 NOTE — PROGRESS NOTES
Patient discharged at this time. Paperwork reviewed with patient and family. Questions answered. Cazares teaching reviewed. Medication filled at hospital pharmacy and sent home with patient. All belongings and valuables with patient at time of departure. Patient and spouse aware of follow up appointment with urology on 4/10/19 at 1000.

## 2019-03-21 LAB — COPATH REPORT: NORMAL

## 2019-03-21 NOTE — DISCHARGE SUMMARY
[facility]    Discharge Summary  Urology    Date of Admission:  3/19/2019  Date of Discharge:  3/20/2019  4:14 PM  Discharging Provider: Keyana Raya PA-C    Discharge Diagnoses   Active Problems:    Prostate cancer (H)      History of Present Illness   Eduardo Gauthier is an 64 year old male who is POD #1 s/p RALP with Dr. Victor.     Hospital Course   Eduardo Gauthier was admitted on 3/19/2019.  The following problems were addressed during his hospitalization: prostate cancer. Patient tolerated the planned procedure well and recovered from anesthesia without issue. Diet was advanced to full liquids, patient tolerated this well. He ambulated without concern. Pain remained minimal and well controlled throughout his postoperative course. Urine clear through perla. All vitals and labs remained WNL. Drain output was low and JANIE was removed prior to discharge.     Plan:   -discharge home with perla   -discussed pain meds, diet, incisional cares, activity for discharge   -Continue SPT clamped, perla to gravity drainage   -follow up with Dr. Victor in 3wks for CT cystogram and perla removal as well as staple removal     Keyana Raya PA-C  [facility]  Pager: [phone]  Office: [phone]    Significant Results and Procedures   DAVINCI XI RADICAL PROSTATECTOMY (ANESTHESIA: GENERAL WITH TAP BLOCK)   SP tube placement.    Path: pending    Pending Results   These results will be followed up by Dr. Victor  Unresulted Labs Ordered in the Past 30 Days of this Admission     Date and Time Order Name Status Description    3/19/2019 1502 Surgical pathology exam In process           Code Status   Full Code    Primary Care Physician   Gaston Cunha        Time Spent on this Encounter   I, Keyana Raya, personally saw the patient today and spent greater than 30 minutes discharging this patient.    Discharge Disposition   Discharged to home  Condition at discharge:  Stable    Consultations This Hospital Stay   HOSPITALIST IP CONSULT  NEPHROLOGY IP CONSULT    Discharge Orders      Reason for your hospital stay    Surgery for prostate cancer     Activity    Avoid strenuous activity, including lifting over 15 pounds or straining for a bowel movement, for the next month while you are healing from surgery. No driving while a catheter is in place or if you are taking narcotic pain meds (Percocet, Norco, Oxycodone, Hydrocodone, etc.)     Wound care and dressings    Keep incision sites clean and dry. You may shower but no tub soaking or swimming x 4wks. If sites are not oozing or draining, you may leave them uncovered and open to air.     Follow Up ([facility])    Follow up with Dr. Victor for CT cystogram and catheter removal on Wednesday, 4/10/19, at 10AM.     [facility]  [address] [place] [address]. [id number]  You may call [phone] with any questions or concerns.   Central Appointment #: [phone]     Diet    Follow this diet upon discharge: Regular     Discharge Medications   Discharge Medication List as of 3/20/2019  3:28 PM      START taking these medications    Details   HYDROcodone-acetaminophen (NORCO) 5-325 MG tablet Take 1 tablet by mouth every 6 hours as needed for moderate to severe pain, Disp-12 tablet, R-0, Local Print         CONTINUE these medications which have NOT CHANGED    Details   bisacodyl (DULCOLAX) 5 MG EC tablet Take 5 mg by mouth daily as needed for constipation, Historical      docusate sodium (DULCOLAX STOOL SOFTENER) 100 MG capsule Take 100 mg by mouth daily as needed for constipation (Dulcolax), Historical      Ferric Citrate (AURYXIA) 1  MG(Fe) tablet Take 840 mg by mouth 3 times daily (with meals) (4 tablet x 210 mg = 840 mg dose), Historical      lidocaine-prilocaine (EMLA) 2.5-2.5 % external cream Apply topically daily as needed for moderate pain (One hour prior to treatment) To Dialysis Access port.Historical       multivitamin, therapeutic (THERA-VIT) TABS tablet Take 1 tablet by mouth daily Men's 50+ (no iron), Historical      ranitidine (ZANTAC) 75 MG tablet Take  mg by mouth daily as needed for heartburn, Historical           Allergies   No Known Allergies  Data   Most Recent 3 CBC's:  Recent Labs   Lab Test 03/20/19  0750   HGB 13.1*      Most Recent 3 BMP's:  Recent Labs   Lab Test 03/20/19  0750 03/19/19  1039     --    POTASSIUM 4.2 3.6   CHLORIDE 98  --    CO2 30  --    BUN 40*  --    CR 6.01* 5.42*   ANIONGAP 9  --    CLAUDIA 8.5  --    *  --      Most Recent 2 LFT's:No lab results found.  Most Recent INR's and Anticoagulation Dosing History:  Anticoagulation Dose History     There is no flowsheet data to display.        Most Recent 3 Troponin's:No lab results found.  Most Recent Cholesterol Panel:No lab results found.  Most Recent 6 Bacteria Isolates From Any Culture (See EPIC Reports for Culture Details):No lab results found.  Most Recent TSH, T4 and A1c Labs:No lab results found.

## 2019-03-22 ENCOUNTER — DOCUMENTATION ONLY (OUTPATIENT)
Dept: OTHER | Facility: CLINIC | Age: 64
End: 2019-03-22

## 2023-07-06 ENCOUNTER — TRANSFERRED RECORDS (OUTPATIENT)
Dept: HEALTH INFORMATION MANAGEMENT | Facility: CLINIC | Age: 68
End: 2023-07-06
Payer: MEDICARE

## 2023-07-11 ENCOUNTER — LAB REQUISITION (OUTPATIENT)
Dept: LAB | Facility: CLINIC | Age: 68
End: 2023-07-11

## 2023-07-11 PROCEDURE — 88365 INSITU HYBRIDIZATION (FISH): CPT | Mod: TC | Performed by: DENTIST

## 2023-07-18 ENCOUNTER — LAB REQUISITION (OUTPATIENT)
Dept: LAB | Facility: CLINIC | Age: 68
End: 2023-07-18

## 2023-07-18 PROCEDURE — 88323 CONSLTJ&REPRT MATRL PREP SLD: CPT | Performed by: PATHOLOGY

## 2023-07-19 LAB
PATH REPORT.COMMENTS IMP SPEC: NORMAL
PATH REPORT.FINAL DX SPEC: NORMAL
PATH REPORT.GROSS SPEC: NORMAL
PATH REPORT.MICROSCOPIC SPEC OTHER STN: NORMAL
PATH REPORT.RELEVANT HX SPEC: NORMAL
PATH REPORT.RELEVANT HX SPEC: NORMAL
PATH REPORT.SITE OF ORIGIN SPEC: NORMAL

## (undated) DEVICE — CATH FOLEY 2WAY 20FR 30ML LUBRICATH LATEX 0166L20

## (undated) DEVICE — DAVINCI XI DRAPE ARM 470015

## (undated) DEVICE — GLOVE PROTEXIS W/NEU-THERA 8.0  2D73TE80

## (undated) DEVICE — SU MONOCRYL 3-0 RB-1 27" UND Y215H

## (undated) DEVICE — NDL INSUFFLATION 13GA 120MM C2201

## (undated) DEVICE — DAVINCI XI SEAL UNIVERSAL 5-8MM 470361

## (undated) DEVICE — TUBING CONMED AIRSEAL SMOKE EVAC INSUFFLATION ASM-EVAC

## (undated) DEVICE — SU VICRYL 0 UR-6 27" J603H

## (undated) DEVICE — DAVINCI XI GRASPER ENDOWRIST PROGRASP 470093

## (undated) DEVICE — DAVINCI XI NDL DRIVER LARGE 470006

## (undated) DEVICE — SU VICRYL 0 CT-1 27" J340H

## (undated) DEVICE — ENDO TROCAR FIRST ENTRY KII FIOS Z-THRD 12X100MM CTF73

## (undated) DEVICE — ESU GROUND PAD UNIVERSAL W/O CORD

## (undated) DEVICE — WIPES FOLEY CARE SURESTEP PROVON DFC100

## (undated) DEVICE — SU ETHILON 2-0 FS 18" 664H

## (undated) DEVICE — SURGICEL HEMOSTAT 3X4" NUKNIT 1943

## (undated) DEVICE — PREP CHLORAPREP 26ML TINTED ORANGE  260815

## (undated) DEVICE — DAVINCI HOT SHEARS TIP COVER  400180

## (undated) DEVICE — GLOVE PROTEXIS BLUE W/NEU-THERA 8.5  2D73EB85

## (undated) DEVICE — SU SILK 0 SH 30" K834H

## (undated) DEVICE — LINEN TOWEL PACK X5 5464

## (undated) DEVICE — SOL WATER IRRIG 1000ML BOTTLE 2F7114

## (undated) DEVICE — ENDO TROCAR SLEEVE KII Z-THREADED 12X100MM CTS22

## (undated) DEVICE — SU MONOCRYL 3-0 RB-1 27" Y305H

## (undated) DEVICE — RX SURGIFLO HEMOSTATIC MATRIX W/THROMBIN 8ML 2994

## (undated) DEVICE — DAVINCI XI GRASPER ENDOWRIST BIPOLAR LONG 470400

## (undated) DEVICE — Device

## (undated) DEVICE — SUCTION IRR STRYKERFLOW II W/TIP 250-070-520

## (undated) DEVICE — BLADE CLIPPER 4406

## (undated) DEVICE — DAVINCI XI HANDPIECE ESU VESSEL SEALER 8MM EXT 480422

## (undated) DEVICE — PACK DAVINCI UROLOGY SBA15UDFSG

## (undated) DEVICE — GRASPER LAPAROSCOPIC EPIX 5MMX35CM C4130

## (undated) DEVICE — CATH PLUG W/CAP 000076

## (undated) DEVICE — ENDO POUCH UNIV RETRIEVAL SYSTEM INZII 10MM CD001

## (undated) DEVICE — SOL NACL 0.9% INJ 1000ML BAG 2B1324X

## (undated) DEVICE — SET EXTENSION MICROBORE 2.0ML 73" EB-072-01

## (undated) DEVICE — KIT PATIENT POSITIONING PIGAZZI LATEX FREE 40580

## (undated) DEVICE — DAVINCI XI DRAPE COLUMN 470341

## (undated) DEVICE — CLIP ENDO HEMO-LOC PURPLE LG 544240

## (undated) DEVICE — DAVINCI XI MONOPOLAR SCISSORS HOT SHEARS 8MM 470179

## (undated) DEVICE — SU VICRYL 0 UR-5 27" J376H

## (undated) DEVICE — CATH TRAY FOLEY SURESTEP 16FR WDRAIN BAG STLK LATEX A300316A

## (undated) DEVICE — DRAIN JACKSON PRATT RESERVOIR 100ML SU130-1305

## (undated) DEVICE — SU VICRYL 2-0 CT-2 27" J333H

## (undated) DEVICE — SUCTION CANISTER MEDIVAC LINER 3000ML W/LID 65651-530

## (undated) DEVICE — DRAIN CHANNEL ROUND 15FR FLUTED 072188

## (undated) RX ORDER — FENTANYL CITRATE 50 UG/ML
INJECTION, SOLUTION INTRAMUSCULAR; INTRAVENOUS
Status: DISPENSED
Start: 2019-03-19

## (undated) RX ORDER — CEFAZOLIN SODIUM 1 G/3ML
INJECTION, POWDER, FOR SOLUTION INTRAMUSCULAR; INTRAVENOUS
Status: DISPENSED
Start: 2019-03-19

## (undated) RX ORDER — GLYCOPYRROLATE 0.2 MG/ML
INJECTION, SOLUTION INTRAMUSCULAR; INTRAVENOUS
Status: DISPENSED
Start: 2019-03-19

## (undated) RX ORDER — ONDANSETRON 2 MG/ML
INJECTION INTRAMUSCULAR; INTRAVENOUS
Status: DISPENSED
Start: 2019-03-19

## (undated) RX ORDER — DEXAMETHASONE SODIUM PHOSPHATE 4 MG/ML
INJECTION, SOLUTION INTRA-ARTICULAR; INTRALESIONAL; INTRAMUSCULAR; INTRAVENOUS; SOFT TISSUE
Status: DISPENSED
Start: 2019-03-19

## (undated) RX ORDER — NEOSTIGMINE METHYLSULFATE 1 MG/ML
VIAL (ML) INJECTION
Status: DISPENSED
Start: 2019-03-19

## (undated) RX ORDER — FUROSEMIDE 10 MG/ML
INJECTION INTRAMUSCULAR; INTRAVENOUS
Status: DISPENSED
Start: 2019-03-19

## (undated) RX ORDER — PROPOFOL 10 MG/ML
INJECTION, EMULSION INTRAVENOUS
Status: DISPENSED
Start: 2019-03-19

## (undated) RX ORDER — HYDROMORPHONE HYDROCHLORIDE 1 MG/ML
INJECTION, SOLUTION INTRAMUSCULAR; INTRAVENOUS; SUBCUTANEOUS
Status: DISPENSED
Start: 2019-03-19

## (undated) RX ORDER — BUPIVACAINE HYDROCHLORIDE 2.5 MG/ML
INJECTION, SOLUTION EPIDURAL; INFILTRATION; INTRACAUDAL
Status: DISPENSED
Start: 2019-03-19

## (undated) RX ORDER — CEFAZOLIN SODIUM IN 0.9 % NACL 3 G/100 ML
INTRAVENOUS SOLUTION, PIGGYBACK (ML) INTRAVENOUS
Status: DISPENSED
Start: 2019-03-19